# Patient Record
Sex: FEMALE | Race: WHITE
[De-identification: names, ages, dates, MRNs, and addresses within clinical notes are randomized per-mention and may not be internally consistent; named-entity substitution may affect disease eponyms.]

---

## 2017-04-17 ENCOUNTER — HOSPITAL ENCOUNTER (EMERGENCY)
Dept: HOSPITAL 59 - ER | Age: 57
LOS: 1 days | Discharge: HOME | End: 2017-04-18
Payer: MEDICARE

## 2017-04-17 DIAGNOSIS — L03.311: ICD-10-CM
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
## 2017-04-17 DIAGNOSIS — T85.738A: Primary | ICD-10-CM

## 2017-04-17 LAB
ANION GAP SERPL CALC-SCNC: 8.9 MMOL/L (ref 7–16)
BASOPHILS NFR BLD: 0.3 % (ref 0–6)
BUN SERPL-MCNC: 12 MG/DL (ref 7–17)
CO2 SERPL-SCNC: 29.1 MMOL/L (ref 22–30)
CREAT SERPL-MCNC: 0.7 MG/DL (ref 0.52–1.04)
EOSINOPHIL NFR BLD: 0.7 % (ref 0–6)
ERYTHROCYTE [DISTWIDTH] IN BLOOD BY AUTOMATED COUNT: 13.6 % (ref 11.5–14.5)
EST GLOMERULAR FILTRATION RATE: > 60 ML/MIN
GLUCOSE SERPL-MCNC: 95 MG/DL (ref 70–110)
GRANULOCYTES NFR BLD: 64.2 % (ref 47–80)
HCT VFR BLD CALC: 40.1 % (ref 35–47)
HGB BLD-MCNC: 13.1 GM/DL (ref 11.6–16)
LYMPHOCYTES NFR BLD AUTO: 26.6 % (ref 16–45)
MCH RBC QN AUTO: 29.7 PG (ref 27–33)
MCHC RBC AUTO-ENTMCNC: 32.7 G/DL (ref 32–36)
MCV RBC AUTO: 90.9 FL (ref 81–97)
MONOCYTES NFR BLD: 8.2 % (ref 0–9)
PLATELET # BLD: 227 K/UL (ref 130–400)
PMV BLD AUTO: 9.8 FL (ref 7.4–10.4)
RBC # BLD AUTO: 4.41 M/UL (ref 3.8–5.4)
WBC # BLD AUTO: 7 K/UL (ref 4.2–12.2)

## 2017-04-17 PROCEDURE — 80048 BASIC METABOLIC PNL TOTAL CA: CPT

## 2017-04-17 PROCEDURE — 99284 EMERGENCY DEPT VISIT MOD MDM: CPT

## 2017-04-17 PROCEDURE — 74176 CT ABD & PELVIS W/O CONTRAST: CPT

## 2017-04-17 PROCEDURE — 96365 THER/PROPH/DIAG IV INF INIT: CPT

## 2017-04-17 PROCEDURE — 85025 COMPLETE CBC W/AUTO DIFF WBC: CPT

## 2017-04-18 NOTE — EMERGENCY DEPARTMENT RECORD
History of Present Illness





- General


Chief complaint: General


Stated complaint: MORPHINE PUMP (MIGHT NEED STITCH)


Time Seen by Provider: 04/17/17 22:38


Source: Patient


Mode of Arrival: Ambulatory


Limitations: No limitations





- History of Present Illness


Initial comments: 


pt has a morphine pump in her abd wall that has become infected.  it is red and 

tender and today had copious yellow drainage.





MD complaint: Abscess/boil


Consistency: Getting worse


Improves with: None


Worsens with: Palpation


Context: Recent antibiotic


Associated symptoms: Denies other symptoms





- Related Data


 Home Medications











 Medication  Instructions  Recorded  Confirmed  Last Taken


 


Hydrocodone/Acetaminophen [Norco 1 tab PO Q4-6HR PRN  tab 11/02/15 04/17/17 04/

17/17





10mg/325mg]    


 


Morphine Sulfate/Pf [Morphine 0.5 8 mg IJ DAILY  vial 11/02/15 04/17/17 04/17/17





Mg/Ml Vial]    


 


Menthol/Herbal Drugs [Cough Drops] 1 each MM DAILY  lozenge 02/23/17 04/17/17 04 /17/17


 


Multivitamin/Iron/Folic Acid 1 each PO DAILY  tab 02/23/17 04/17/17 04/17/17





[Centrum Complete Multivit Tab]    








 Previous Rx's











 Medication  Instructions  Recorded


 


Cephalexin [Keflex] 500 mg PO QID #40 cap 04/18/17











 Allergies











Allergy/AdvReac Type Severity Reaction Status Date / Time


 


eszopiclone [From Lunesta] Allergy Intermediate RASH Verified 04/08/16 13:59


 


erythromycin lactobionate Allergy Unknown RASH Verified 04/08/16 13:59


 


Penicillins Allergy Unknown RASH Verified 04/08/16 13:59


 


Sulfa (Sulfonamide Allergy Unknown RASH Verified 04/08/16 13:59





Antibiotics)     


 


hydromorphone HCl Allergy  PT UNSURE Verified 04/12/16 14:14





[From Dilaudid]   OF REACTION  


 


nystatin [From Mycostatin] Allergy  PT UNSURE Verified 04/12/16 14:14





   OF REACTION  


 


nickel AdvReac Severe HIVES Verified 04/08/16 13:59


 


diclofenac sodium AdvReac  NAUSEA AND Verified 04/12/16 14:14





[From Voltaren]   VOMITING  


 


surgical tape AdvReac Severe BLISTERS Uncoded 09/29/15 12:23














Travel Screening





- Travel/Exposure Within Last 30 Days


Have you traveled within the last 30 days?: No





- Travel Symptoms


Symptom Screening: None





Review of Systems


Reviewed: No additional complaints except as noted below


Constitutional: Reports: As per HPI.  Denies: Chills, Fever, Malaise, Night 

sweats, Weakness, Weight change


Eyes: Reports: As per HPI.  Denies: Eye discharge, Eye pain, Photophobia, 

Vision change


ENT: Reports: As per HPI.  Denies: Congestion, Dental pain, Ear pain, Epistaxis

, Hearing loss, Throat pain


Respiratory: Reports: As per HPI.  Denies: Cough, Dyspnea, Hemoptysis, Stridor, 

Wheezes


Cardiovascular: Reports: As per HPI.  Denies: Arrhythmia, Chest pain, Dyspnea 

on exertion, Edema, Murmurs, Orthopnea, Palpitations, Paroxysmal nocturnal 

dyspnea, Rheumatic Fever, Syncope


Endocrine: Reports: As per HPI.  Denies: Fatigue, Heat or cold intolerance, 

Polydipsia, Polyuria


Gastrointestinal: Reports: As per HPI.  Denies: Abdominal pain, Constipation, 

Diarrhea, Hematemesis, Hematochezia, Melena, Nausea, Vomiting


Genitourinary: Reports: As per HPI.  Denies: Abnormal menses, Discharge, 

Dyspareunia, Dysuria, Frequency, Hematuria, Incontinence, Retention, Urgency


Musculoskeletal: Reports: As per HPI.  Denies: Arthralgia, Back pain, Gout, 

Joint swelling, Myalgia, Neck pain


Skin: Reports: As per HPI.  Denies: Bruising, Change in color, Change in hair/

nails, Lesions, Pruritus, Rash


Neurological: Reports: As per HPI.  Denies: Abnormal gait, Confusion, Headache, 

Numbness, Paresthesias, Seizure, Tingling, Tremors, Vertigo, Weakness


Psychiatric: Reports: As per HPI.  Denies: Anxiety, Auditory hallucinations, 

Depression, Homicidal thoughts, Suicidal thoughts, Visual hallucinations


Hematological/Lymphatic: Reports: As per HPI.  Denies: Anemia, Blood Clots, 

Easy bleeding, Easy bruising, Swollen glands





Past Medical History





- SOCIAL HISTORY


Smoking Status: Current every day smoker





- RESPIRATORY


Hx Respiratory Disorders: Yes


Hx Bronchitis: Yes (hx)


Hx Pneumonia: Yes (hx)





- CARDIOVASCULAR


Hx Cardio Disorders: No


Comment:: was good until problems with pump





- NEURO


Hx Neuro Disorders: Yes


Comment:: shingles 12-15 back left shoulder under left breast





- GI


Hx GI Disorders: Yes


Hx Abdominal Pain: Yes


Hx Nausea/Vomiting: Yes





- 


Hx Genitourinary Disorders: No





- ENDOCRINE


Hx Endocrine Disorders: No





- MUSCULOSKELETAL


Hx Musculoskeletal Disorders: Yes


Comment:: osteopenia





- PSYCH


Hx Psych Problems: No





- HEMATOLOGY/ONCOLOGY


Hx Hematology/Oncology Disorders: No





Family Medical History


Any Significant Family History?: No


Family Hx Comment (NOT TO BE USED IN PLACE OF ITEMS BELOW): denies





Physical Exam





- General


General Appearance: Alert, Oriented x3, Cooperative, Mild distress





- Head


Head exam: Normal inspection





- Eye


Eye exam: Normal appearance, PERRL, EOMI


Pupils: Normal accommodation





- ENT


ENT exam: Normal exam, Mucous membranes moist, Normal external ear exam, Normal 

orophraynx


Ear exam: Normal external inspection.  negative: External canal tenderness


Nasal Exam: Normal inspection.  negative: Discharge, Sinus tenderness


Mouth exam: Normal external inspection, Tongue normal


Teeth exam: Normal inspection.  negative: Dental caries


Throat exam: Normal inspection.  negative: Tonsillar erythema, Tonsillar exudate





- Neck


Neck exam: Normal inspection, Full ROM.  negative: Tenderness





- Respiratory


Respiratory exam: Normal lung sounds bilaterally.  negative: Respiratory 

distress





- Cardiovascular


Cardiovascular Exam: Regular rate, Normal rhythm, Normal heart sounds





- GI/Abdominal


GI/Abdominal exam: Soft, Normal bowel sounds, Tenderness, Other (erythema over 

morphine pump with purulent drainage)





- Rectal


Rectal exam: Deferred





- 


 exam: Deferred





- Extremities


Extremities exam: Normal inspection, Full ROM, Normal capillary refill.  

negative: Tenderness





- Back


Back exam: Reports: Normal inspection, Full ROM.  Denies: Muscle spasm, Rash 

noted, Tenderness





- Neurological


Neurological exam: Alert, CN II-XII intact, Normal gait, Oriented X3





- Psychiatric


Psychiatric exam: Normal affect, Normal mood





- Skin


Skin exam: Dry, Intact, Normal color, Warm





Course





 Vital Signs











  04/17/17 04/18/17





  21:48 00:44


 


Temperature 98.0 F 97.9 F


 


Pulse Rate 69 


 


Pulse Rate [  69





Pulse Ox Probe]  


 


Respiratory 16 16





Rate  


 


Blood Pressure 121/81 


 


Blood Pressure  120/77





[Right Arm]  


 


Pulse Ox 96 98














- Reevaluation(s)


Reevaluation #1: 





04/18/17 00:52


ct shows stranding





Reevaluation #2: 





04/18/17 00:59


dr pepe will be contacted at 0630





Medical Decision Making





- Lab Data


Result diagrams: 


 04/17/17 23:12





 04/17/17 23:12





 Lab Results











  04/17/17 04/17/17 Range/Units





  23:12 23:12 


 


WBC  7.0   (4.2-12.2)  K/uL


 


RBC  4.41   (3.80-5.40)  M/uL


 


Hgb  13.1   (11.6-16.0)  gm/dl


 


Hct  40.1   (35.0-47.0)  %


 


MCV  90.9   (81-97)  fl


 


MCH  29.7   (27-33)  pg


 


MCHC  32.7   (32-36)  g/dl


 


RDW  13.6   (11.5-14.5)  %


 


Plt Count  227   (130-400)  K/uL


 


MPV  9.8   (7.4-10.4)  fl


 


Gran %  64.2   (47-80)  %


 


Lymphocytes %  26.6   (16-45)  %


 


Monocytes %  8.2   (0-9)  %


 


Eosinophils %  0.7   (0-6)  %


 


Basophils %  0.3   (0-6)  %


 


Sodium   138  (136-145)  mmol/L


 


Potassium   3.9  (3.5-5.1)  mmol/L


 


Chloride   100  ()  mmol/L


 


Carbon Dioxide   29.1  (22-30)  mmol/L


 


Anion Gap   8.9  (7-16)  


 


BUN   12  (7-17)  mg/dL


 


Creatinine   0.7  (0.52-1.04)  mg/dL


 


Estimated GFR   > 60  ml/min


 


Random Glucose   95  ()  mg/dL


 


Calcium   9.3  (8.5-10.1)  mg/dL














Disposition


Disposition: Discharge


Clinical Impression: 


Cellulitis


Qualifiers:


 Site of cellulitis: trunk Site of cellulitis of trunk: abdominal wall 

Qualified Code(s): L03.311 - Cellulitis of abdominal wall


Disposition: Home, Self-Care


Condition: (1) Good


Instructions:  Cellulitis (ED)


Additional Instructions: 


follow up with dr pepe without fail in the morning.  return sooner if worse


Prescriptions: 


Cephalexin [Keflex] 500 mg PO QID #40 cap


Forms:  Patient Portal Access

## 2017-04-19 ENCOUNTER — HOSPITAL ENCOUNTER (OUTPATIENT)
Dept: HOSPITAL 59 - SUR | Age: 57
Discharge: HOME | End: 2017-04-19
Attending: PAIN MEDICINE
Payer: MEDICARE

## 2017-04-19 DIAGNOSIS — M96.1: ICD-10-CM

## 2017-04-19 DIAGNOSIS — M54.16: ICD-10-CM

## 2017-04-19 DIAGNOSIS — T85.738A: Primary | ICD-10-CM

## 2017-04-19 DIAGNOSIS — M54.17: ICD-10-CM

## 2017-04-19 LAB
ACTH PLAS-MCNC: 3.5 MINUTES (ref 1.5–7)
APPEARANCE CSF: CLEAR
APTT BLD: 26.6 SECONDS (ref 24.5–39.1)
COLOR CSF: COLORLESS
CSF RBC: 0 /MM3
CYCLOSPORINE BLD-MCNC: 0 /UL
GLUCOSE CSF-MCNC: 67 MG/DL (ref 40–75)
INR PPP: 0.87
PROTHROMBIN TIME: 9.8 SECONDS (ref 9.5–12.1)
TOTAL PROTEIN,CSF: 36 MG/DL (ref 12–60)

## 2017-04-19 PROCEDURE — 84157 ASSAY OF PROTEIN OTHER: CPT

## 2017-04-19 PROCEDURE — 85730 THROMBOPLASTIN TIME PARTIAL: CPT

## 2017-04-19 PROCEDURE — 89051 BODY FLUID CELL COUNT: CPT

## 2017-04-19 PROCEDURE — 82945 GLUCOSE OTHER FLUID: CPT

## 2017-04-19 PROCEDURE — 85002 BLEEDING TIME TEST: CPT

## 2017-04-19 PROCEDURE — 85610 PROTHROMBIN TIME: CPT

## 2017-04-19 NOTE — HISTORY AND PHYSICAL REPORT
CHIEF COMPLAINT/HISTORY OF CHIEF COMPLAINT:  This patient presented to the 
Emergency Room two nights ago with a draining wound in the left lower abdominal 
quadrant pump pouch where a spinal opioid infusion system with Morphine is in 
place.  The report from the Emergency Room physician which was received and 
discussed indicated a possible abscess.  The previous morning she was evaluated 
in the office, there was some drainage, and the site did appear to demonstrate 
some cellulitis.  Her infusion at that point was reduced by 50% from 3.0 to 1.5 
mg per day of Morphine.  



PAST MEDICAL HISTORY:  Partial deafness.  



PAST SURGICAL HISTORY:  Lumbar spinal surgery and pump implant.  



MEDICATIONS ON ADMISSION:  List to be provided.  



ALLERGIES:  



FAMILY/PSYCHOSOCIAL HISTORY:  



REVIEW OF SYSTEMS:  



PHYSICAL EXAMINATION:  Height and weight are not known.  Vital signs are not 
available.  

HEENT:  Within normal limits.  

LUNGS:  Clear.  

HEART:  Regular rate and rhythm.  

ABDOMEN:  Nontender.  

MUSCULOSKELETAL:  Examination of the musculoskeletal system shows a pump in the 
left lower abdominal quadrant.  There is inflammation at the mid incisional 
line.  No drainage at this point.  There is cellulitis around the skin.  The 
posterior incisional site for the catheter is intact.  Sensory fields are 
intact.  

NEUROLOGIC:  Cranial nerves are intact.  



IMPRESSION:  

1.  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 

2.  LUMBAR RADICULITIS, ICD10 CODE M54.16 AND M54.17.  

3.  PUMP POUCH INFECTION.  



PLAN:  The patient is here for removal of the device, both pump and catheter.  
We have discussed the possibility of flipping the patient and replacing the 
system posteriorly, however, this decision will only be made after the site is 
inspected.  If it is felt that the infection perhaps progressed into the pouch 
then the entire system will be removed and she will be started on the 
appropriate medication protocol.  







______________________________                  ___________________Lorne WAGONER D.O.                                            Date & Time



JOB NUMBER:  [id number]
MTDD

## 2017-04-19 NOTE — OPERATIVE NOTE - FERRO
DATE OF SURGERY:     04/19/17



PREOPERATIVE DIAGNOSES:   

1. POST LUMBAR LAMINECTOMY SYNDROME, ICD-10 CODE = M96.1. 

2. LUMBAR RADICULITIS, ICD-10 CODE = M54.16 AND M54.17. 

3. INTRASPINAL INFUSION SYSTEM MORPHINE PUMP POUCH INFECTION.   



OPERATION:   

1. INCISION, SUBCUTANEOUS DISSECTION, AND REMOVAL OF INDWELLING PUMP AT LEFT 
ABDOMINAL QUADRANT WITH CULTURES. 

2. INCISION, SUBCUTANEOUS DISSECTION, AND REMOVAL OF INDWELLING SPINAL CATHETER 
L3-4 WITH CULTURES. 

3. CSF ANALYSIS AND SPECIMENS TAKEN FOR MICROBIOLOGY AND ANALYSIS.  



SURGEON:        JOHN WAGONER D.O.



ANESTHESIA:     LOCAL SEDATION.



ANESTHESIA PROVIDER:     LINDA ALDANA CRNA. 



INDICATION:  This patient presents with a history of intractable lumbar 
radiculitis managed to a spinal opioid infusion system with Morphine. Slowly 
evolving appeared to be a puncture at the midpoint of the incisional site, 
which over the last 48 hours drained purulent discharge. She was seen in the 
Emergency Room, cultures were taken. She presented to the office. The area in 
the left lower abdominal quadrant appeared to be somewhat cellulitic and 
discolored. She was afebrile and not on antibiotics. She is here for removal of 
the system and appropriate cultures.  



PROCEDURE:  Intravenous line, vital sign monitoring, IV sedation, prepped and 
draped sterile technique. This patient was positioned supine. The left lower 
abdominal pump quadrant pouch noted. Sterile prep and technique and under 
imaging with local anesthetic, the incision was opened, cultures immediately 
taken aerobic and anaerobic. The pump was then exteriorized. The pouch was 
inspected and there appeared to be membranous coating along the inner surface, 
which was felt to be related to the infection. At that point, antibiotic 
irrigation and Bovie for hemostasis. The catheter segments and components were 
then removed along with the pump. All catheter components submitted for 
culture. Antibiotic irrigation. Bovie for hemostasis. The incision was then 
closed Vicryl for fascia and staples for skin. She was turned prone. The spinal 
catheter incision after sterile prep and technique was infiltrated, incision 
made, and subcutaneous dissection was conducted to the implanted catheter. 
Cultures taken aerobic and anaerobic. A pursestring suture was placed. The end 
of the catheter was cut and then using a 25-gauge needle and a 3 mL syringe, 
six tubes for CSF analysis 1 mL CSF per tube was then take for appropriate 
cultures and analysis. The pursestring was tightened, it was to stop CSF leak, 
and then after the catheter was removed intact. Antibiotic irrigation. Bovie 
for hemostasis. A previous catheter component from a past pump was seen 
subcutaneous incision made and attempts to pull the catheter were partially 
successful. The old residual catheter was not related to the current system so 
it was not felt to be infectious. At that point, all incisions closed Vicryl 
for fascia and staples for skin. Dressings were placed. She was transported to 
the Recovery Room stable, pillow under head and knee. She will be managed, 
encouraged to stay overnight for observation.   



DISCHARGE INSTRUCTIONS:

1. The sites will remain clean and dry. No showering or bathing in any way that 
would disrupt dressings.  

2. Standard medications including the antibiotic, Levaquin, 500 mg once a day 
for 14 days. 

3. Medications will be provided to manage withdrawal including Librium, oral 
Morphine, which was the analgesic of choice, and Zofran for symptomatic 
nauseousness. She will be seen in the office in 3-5 days to check the sites. 
She will manage withdrawal symptoms and pain with oral medications. 

4. All other instructions provided, numbers to contact, problems given. She 
will be seen in the office.  







______________________      ________________

JOHN WAGONER D.O.                Date & Time

cc: Dr. Armand Govea

JOB NUMBER: [id number]
MTDD

## 2017-04-20 ENCOUNTER — HOSPITAL ENCOUNTER (EMERGENCY)
Dept: HOSPITAL 59 - ER | Age: 57
Discharge: HOME | End: 2017-04-20
Payer: MEDICARE

## 2017-04-20 DIAGNOSIS — F41.9: ICD-10-CM

## 2017-04-20 DIAGNOSIS — G89.29: ICD-10-CM

## 2017-04-20 DIAGNOSIS — M54.9: ICD-10-CM

## 2017-04-20 DIAGNOSIS — T40.2X5A: ICD-10-CM

## 2017-04-20 DIAGNOSIS — F11.23: Primary | ICD-10-CM

## 2017-04-20 DIAGNOSIS — R45.1: ICD-10-CM

## 2017-04-20 LAB
ALBUMIN SERPL-MCNC: 3.8 GM/DL (ref 3.5–5)
ALBUMIN/GLOB SERPL: 1.3 {RATIO} (ref 1.1–1.8)
ALP SERPL-CCNC: 82 U/L (ref 38–126)
ALT SERPL-CCNC: 24 U/L (ref 9–52)
ANION GAP SERPL CALC-SCNC: 7.4 MMOL/L (ref 7–16)
AST SERPL-CCNC: 17 U/L (ref 14–36)
BILIRUB SERPL-MCNC: 0.27 MG/DL (ref 0.2–1.3)
BUN SERPL-MCNC: 4 MG/DL (ref 7–17)
CO2 SERPL-SCNC: 27.6 MMOL/L (ref 22–30)
CREAT SERPL-MCNC: 0.7 MG/DL (ref 0.52–1.04)
EOSINOPHIL NFR BLD: 2 % (ref 0–6)
ERYTHROCYTE [DISTWIDTH] IN BLOOD BY AUTOMATED COUNT: 13.9 % (ref 11.5–14.5)
EST GLOMERULAR FILTRATION RATE: > 60 ML/MIN
GLOBULIN SER-MCNC: 3 GM/DL (ref 1.4–4.8)
GLUCOSE SERPL-MCNC: 110 MG/DL (ref 70–110)
HCT VFR BLD CALC: 39.3 % (ref 35–47)
HGB BLD-MCNC: 12.7 GM/DL (ref 11.6–16)
LYMPHOCYTES NFR BLD: 14 % (ref 16–45)
MCH RBC QN AUTO: 29.5 PG (ref 27–33)
MCHC RBC AUTO-ENTMCNC: 32.3 G/DL (ref 32–36)
MCV RBC AUTO: 91.4 FL (ref 81–97)
MONOCYTES NFR BLD: 10 % (ref 0–9)
NEUTROPHILS NFR BLD AUTO: 74 % (ref 47–80)
PLATELET # BLD EST: NORMAL 10*3/UL
PLATELET # BLD: 244 K/UL (ref 130–400)
PMV BLD AUTO: 9.3 FL (ref 7.4–10.4)
PROT SERPL-MCNC: 6.8 GM/DL (ref 6.3–8.2)
RBC # BLD AUTO: 4.3 M/UL (ref 3.8–5.4)
RBC MORPH BLD: NORMAL
WBC # BLD AUTO: 5.1 K/UL (ref 4.2–12.2)

## 2017-04-20 PROCEDURE — 99284 EMERGENCY DEPT VISIT MOD MDM: CPT

## 2017-04-20 PROCEDURE — 80053 COMPREHEN METABOLIC PANEL: CPT

## 2017-04-20 PROCEDURE — 96372 THER/PROPH/DIAG INJ SC/IM: CPT

## 2017-04-20 PROCEDURE — 99283 EMERGENCY DEPT VISIT LOW MDM: CPT

## 2017-04-20 PROCEDURE — 85027 COMPLETE CBC AUTOMATED: CPT

## 2017-04-20 NOTE — EMERGENCY DEPARTMENT RECORD
Anxiety





- General


Chief Complaint: General


Stated Complaint: WITHDRAWAL


Time Seen by Provider: 17 09:34


Source: Patient


Mode of Arrival: Wheelchair


Limitations: No limitations





- History of Present Illness


Initial Comments: 


The patient is here due to a one day hx of anxiety, restlessness and shaking. 

She had her morphine pump removed yesterday due to an infection and now feels 

like she is in withdrawal. She denies any CP, SOB or AP but did have nausea 

earlier today but that has resolved. The patient has had a morphine pump for 

the last 12 years due to chronic back pain and has been in withdrawal once 

before when it malfunctioned. She denies any fever, chills, or HA.





MD Complaint: Anxiety


Onset/Timin


-: Days(s)





- Related Data


Home Medications: 


 Home Medications











 Medication  Instructions  Recorded  Confirmed  Last Taken


 


Hydrocodone/Acetaminophen [Norco 1 tab PO Q4-6HR PRN  tab 11/02/15 04/20/17 04/

17/17





10mg/325mg]    


 


Morphine Sulfate/Pf [Morphine 0.5 8 mg IJ DAILY  vial 11/02/15 04/20/17 04/17/17





Mg/Ml Vial]    


 


Multivitamin/Iron/Folic Acid 1 each PO DAILY  tab 17





[Centrum Complete Multivit Tab]    


 


Chlordiazepoxide HCl [Librium] 25 mg PO Q6H 17 Unknown


 


Levofloxacin [Levaquin] 500 mg PO BID 17 Unknown


 


Morphine Sulfate 15 mg PO Q6H PRN 17 23:14











Allergies/Adverse Reactions: 


 Allergies











Allergy/AdvReac Type Severity Reaction Status Date / Time


 


eszopiclone [From Lunesta] Allergy Intermediate RASH Verified 16 13:59


 


erythromycin lactobionate Allergy Unknown RASH Verified 16 13:59


 


Penicillins Allergy Unknown RASH Verified 16 13:59


 


Sulfa (Sulfonamide Allergy Unknown RASH Verified 16 13:59





Antibiotics)     


 


hydromorphone HCl Allergy  PT UNSURE Verified 16 14:14





[From Dilaudid]   OF REACTION  


 


nystatin [From Mycostatin] Allergy  PT UNSURE Verified 16 14:14





   OF REACTION  


 


nickel AdvReac Severe HIVES Verified 16 13:59


 


diclofenac sodium AdvReac  NAUSEA AND Verified 16 14:14





[From Voltaren]   VOMITING  


 


surgical tape AdvReac Severe BLISTERS Uncoded 09/29/15 12:23














Travel Screening





- Travel/Exposure Within Last 30 Days


Have you traveled within the last 30 days?: No





Review of Systems


Constitutional: Denies: Chills, Fever


Eyes: Denies: Eye discharge


ENT: Denies: Congestion


Respiratory: Denies: Cough, Dyspnea





Past Medical History





- SOCIAL HISTORY


Smoking Status: Light tobacco smoker (<10/day)


Alcohol Use: None


Drug Use: None





- RESPIRATORY


Hx Respiratory Disorders: Yes


Hx Bronchitis: Yes (on inhaler since bronchitis 2-)


Hx Pneumonia: Yes (hx)





- CARDIOVASCULAR


Hx Cardio Disorders: No


Comment:: was good until problems with pump





- NEURO


Hx Neuro Disorders: Yes


Comment:: shingles 12-15 back left shoulder under left breast





- GI


Hx GI Disorders: Yes


Hx Abdominal Pain: Yes


Hx Nausea/Vomiting: Yes





- 


Hx Genitourinary Disorders: No





- ENDOCRINE


Hx Endocrine Disorders: No





- MUSCULOSKELETAL


Hx Musculoskeletal Disorders: Yes


Hx Arthritis: Yes


Comment:: osteopenia





- PSYCH


Hx Psych Problems: No





- HEMATOLOGY/ONCOLOGY


Hx Hematology/Oncology Disorders: No





Family Medical History


Any Significant Family History?: No


Family Hx Comment (NOT TO BE USED IN PLACE OF ITEMS BELOW): denies





Physical Exam





- General


General Appearance: Alert, Oriented x3, Cooperative, Mild distress (due to 

anxiety and restlessness.)





- Head


Head exam: Normal inspection





- Eye


Eye exam: Normal appearance, PERRL





- Neck


Neck exam: Normal inspection, Full ROM.  negative: Tenderness





- Respiratory


Respiratory exam: Normal lung sounds bilaterally.  negative: Respiratory 

distress





- Cardiovascular


Cardiovascular Exam: Regular rate, Normal rhythm, Normal heart sounds





- GI/Abdominal


GI/Abdominal exam: Soft, Normal bowel sounds.  negative: Tenderness





- Extremities


Extremities exam: Normal inspection, Full ROM, Normal capillary refill.  

negative: Tenderness





- Neurological


Neurological exam: Alert, Normal gait, Oriented X3.  negative: Abnormal gait, 

Motor sensory deficit





- Psychiatric


Psychiatric exam: Anxious.  negative: Depressed, Flat affect





Course





 Vital Signs











  17





  09:26


 


Temperature 98.3 F


 


Pulse Rate 88


 


Respiratory 18





Rate 


 


Blood Pressure 126/76


 


Pulse Ox 99














- Reevaluation(s)


Reevaluation #1: 


The patient is doing much better at this time. She is now resting comfortably 

with no restlessness or anxiety.





17 10:19





Reevaluation #2: 


The patient is still doing very well and is resting comfortably. 





17 11:07





Reevaluation #3: 


The patient is doing very well presently. She denies any nausea, vomiting, 

diarrhea or any anxiety presently. I have been unable to contact Dr. Mariscal but 

did discuss the case with his nurse. The patient does feel comfortable going 

home and will F/U with Dr. Mariscal next week as planned.





17 11:34








Medical Decision Making





- Lab Data


Result diagrams: 


 17 09:53





 17 09:53





Disposition


Disposition: Discharge


Clinical Impression: 


 Opiate withdrawal


Disposition: Home, Self-Care


Condition: (1) Good


Instructions:  Opioid Withdrawal (ED)


Additional Instructions: 


Please continue your regular medicines as directed per Dr. Mariscal. Please return 

to the ER for any worsening symptoms. Please see Dr. Mariscal for recheck next 

week as planned.


Forms:  Patient Portal Access


Time of Disposition: 11:36

## 2017-11-19 ENCOUNTER — HOSPITAL ENCOUNTER (EMERGENCY)
Dept: HOSPITAL 59 - ER | Age: 57
Discharge: HOME | End: 2017-11-19
Payer: MEDICARE

## 2017-11-19 DIAGNOSIS — J01.00: Primary | ICD-10-CM

## 2017-11-19 DIAGNOSIS — F17.210: ICD-10-CM

## 2017-11-19 PROCEDURE — 99282 EMERGENCY DEPT VISIT SF MDM: CPT

## 2017-11-19 NOTE — EMERGENCY DEPARTMENT RECORD
History of Present Illness





- General


Chief complaint: Cold


Stated complaint: COLD


Time Seen by Provider: 17 12:21


Source: Patient, Family


Mode of Arrival: Ambulatory


Limitations: No limitations





- History of Present Illness


Initial comments: 


56 yo female presents with upper respiratory symptoms.  She has been having 

symptoms for about 9-10 days.  She developed nasal congestion with drainage and 

facial pressure.  She has some pressure in her ears.  No significant sore 

throat.  Mild non productive cough.  No fevers.  No vomiting or diarrhea.  No 

rash.  She has irritation in the right nostril.  No epistaxis.  The nasal 

discharge is discolored.





MD complaint: Ear pain, Other (Congestion)


Onset/Timin


-: Days(s) (10)


Location: Nose


Severity: Moderate


Quality: Aching


Improves with: None


Worsens with: None


Associated Symptoms: Cough, Other





- Related Data


 Home Medications











 Medication  Instructions  Recorded  Confirmed  Last Taken


 


Pregabalin [Lyrica] 50 mg PO DAILY 17 Unknown








 Previous Rx's











 Medication  Instructions  Recorded


 


Doxycycline Hyclate [Doxycycline] 100 mg PO BID #14 cap 17


 


Triamcinolone Acetonide 16.5 gm NS DAILY #1 inh 17





[Nasacort-Aq]  











 Allergies











Allergy/AdvReac Type Severity Reaction Status Date / Time


 


eszopiclone [From Lunesta] Allergy Intermediate RASH Verified 17 12:22


 


erythromycin lactobionate Allergy Unknown RASH Verified 17 12:22


 


Penicillins Allergy Unknown RASH Verified 17 12:22


 


Sulfa (Sulfonamide Allergy Unknown RASH Verified 17 12:22





Antibiotics)     


 


hydromorphone HCl Allergy  PT UNSURE Verified 17 12:22





[From Dilaudid]   OF REACTION  


 


nystatin [From Mycostatin] Allergy  PT UNSURE Verified 17 12:22





   OF REACTION  


 


nickel AdvReac Severe HIVES Verified 17 12:22


 


diclofenac sodium AdvReac  NAUSEA AND Verified 17 12:22





[From Voltaren]   VOMITING  


 


surgical tape AdvReac Severe BLISTERS Uncoded 09/29/15 12:23














Travel Screening





- Travel/Exposure Within Last 30 Days


Have you traveled within the last 30 days?: No





Review of Systems


Constitutional: Reports: Malaise.  Denies: Chills, Fever


Eyes: Denies: Eye discharge, Eye pain, Photophobia, Vision change


ENT: Reports: Congestion, Ear pain.  Denies: Epistaxis, Throat pain


Respiratory: Reports: Cough.  Denies: Dyspnea, Hemoptysis, Stridor, Wheezes


Cardiovascular: Denies: Chest pain, Palpitations, Syncope


Endocrine: Denies: Fatigue, Polydipsia, Polyuria


Gastrointestinal: Denies: Abdominal pain, Diarrhea, Nausea, Vomiting


Genitourinary: Denies: Dysuria, Urgency


Musculoskeletal: Denies: Arthralgia, Back pain, Myalgia, Neck pain


Skin: Denies: Bruising, Change in color, Rash


Neurological: Denies: Headache, Numbness, Tremors, Vertigo, Weakness


Psychiatric: Denies: Anxiety


Hematological/Lymphatic: Denies: Blood Clots, Easy bleeding, Easy bruising, 

Swollen glands





Past Medical History





- SOCIAL HISTORY


Smoking Status: Light tobacco smoker (<10/day)


Drug Use: None





- RESPIRATORY


Hx Respiratory Disorders: Yes


Hx Bronchitis: Yes (on inhaler since bronchitis 2-17)


Hx Pneumonia: Yes (hx)





- CARDIOVASCULAR


Hx Cardio Disorders: No


Comment:: was good until problems with pump





- NEURO


Hx Neuro Disorders: Yes


Comment:: shingles 12-15 back left shoulder under left breast





- GI


Hx GI Disorders: Yes


Hx Abdominal Pain: Yes


Hx Nausea/Vomiting: Yes





- 


Hx Genitourinary Disorders: No





- ENDOCRINE


Hx Endocrine Disorders: No





- MUSCULOSKELETAL


Hx Musculoskeletal Disorders: Yes


Hx Arthritis: Yes


Comment:: osteopenia





- PSYCH


Hx Psych Problems: No





- HEMATOLOGY/ONCOLOGY


Hx Hematology/Oncology Disorders: No





Family Medical History


Family Hx Comment (NOT TO BE USED IN PLACE OF ITEMS BELOW): denies





Physical Exam





- General


General Appearance: Alert, Oriented x3, Cooperative, No acute distress


Limitations: No limitations





- Head


Head exam: Atraumatic, Normocephalic, Normal inspection





- Eye


Eye exam: Normal appearance, PERRL, Periorbital tenderness, Other (maxillary 

and fronatal tenderness to palpation).  negative: Conjunctival injection, 

Periorbital swelling





- ENT


ENT exam: Mucous membranes moist.  negative: Mucous membranes dry


Ear exam: Normal external inspection.  negative: External canal tenderness


Nasal Exam: Discharge, Sinus tenderness.  negative: Active bleeding


Mouth exam: Tongue normal.  negative: Drooling, Muffled voice, Trismus


Teeth exam: Normal inspection


Throat exam: Normal inspection.  negative: Tonsillar erythema, Tonsillomegaly, 

Tonsillar exudate, R peritonsillar mass, L peritonsillar mass





- Neck


Neck exam: Normal inspection, Full ROM.  negative: Lymphadenopathy, Tenderness





- Respiratory


Respiratory exam: Normal lung sounds bilaterally.  negative: Accessory muscle 

use, Decreased breath sounds, Prolonged expiratory, Respiratory distress, 

Rhonchi, Stridor, Wheezes





- Cardiovascular


Cardiovascular Exam: Regular rate, Normal rhythm, Normal heart sounds


Peripheral Pulses: 2+: Radial (R), Radial (L)





- GI/Abdominal


GI/Abdominal exam: Soft.  negative: Tenderness





- Rectal


Rectal exam: Deferred





- 


 exam: Deferred





- Extremities


Extremities exam: Normal inspection, Full ROM, Normal capillary refill.  

negative: Pedal edema, Tenderness





- Back


Back exam: Reports: Normal inspection, Full ROM.  Denies: CVA tenderness (R), 

CVA tenderness (L), Muscle spasm, Rash noted, Tenderness





- Neurological


Neurological exam: Alert, Normal gait, Oriented X3





- Psychiatric


Psychiatric exam: Normal affect, Normal mood





- Skin


Skin exam: Dry, Intact, Normal color, Warm





Course





 Vital Signs











  17





  12:18


 


Temperature 97.8 F


 


Pulse Rate 89


 


Respiratory 16





Rate 


 


Blood Pressure 136/90


 


Pulse Ox 95














- Reevaluation(s)


Reevaluation #1: 


The vitals were reviewed.


17 12:24








Disposition


Disposition: Discharge


Clinical Impression: 


Sinusitis


Qualifiers:


 Sinusitis location: maxillary Chronicity: acute Recurrence: not specified as 

recurrent Qualified Code(s): J01.00 - Acute maxillary sinusitis, unspecified





Disposition: Home, Self-Care


Condition: (1) Good


Instructions:  Sinusitis (ED)


Additional Instructions: 


Call your doctor to be seen this week if not improving


Return to the ER if worse or any new concerns


Rest and stay well hydrated


Prescriptions: 


Doxycycline Hyclate [Doxycycline] 100 mg PO BID #14 cap


Triamcinolone Acetonide [Nasacort-Aq] 16.5 gm NS DAILY #1 inh


Forms:  Patient Portal Access


Time of Disposition: 12:37





Quality





- Quality Measures


Quality Measures: N/A





- Blood Pressure Screening


Does Patient Have Any of the Following: No


Blood Pressure Classification: Hypertensive Reading


Systolic Measurement: 136


Diastolic Measurement: 90


Screening for High Blood Pressure: < Pre-Hypertensive BP, F/U Documented > [

]


Pre-Hypertensive Follow-up Interventions: Referral to alternative/primary care 

provider.

## 2019-09-27 ENCOUNTER — HOSPITAL ENCOUNTER (OUTPATIENT)
Dept: HOSPITAL 59 - HOP | Age: 59
Discharge: HOME | End: 2019-09-27
Attending: INTERNAL MEDICINE
Payer: MEDICARE

## 2019-09-27 DIAGNOSIS — Z12.11: Primary | ICD-10-CM

## 2019-09-27 DIAGNOSIS — Z53.09: ICD-10-CM

## 2019-09-27 PROCEDURE — 00812 ANES LWR INTST SCR COLSC: CPT

## 2019-09-30 NOTE — OPERATIVE NOTE
SURGEON: Caprice Diaz MD  



OPERATION: COLONOSCOPY. 



INDICATIONS: This is a 59-year-old female with average risk for colorectal 
cancer who presented for her first screening colonoscopy. 



POSTOPERATIVE DIAGNOSIS: Poor bowel preparation, could not complete the 
examination beyond sigmoid colon. 



ANESTHESIA: Sedation is per Anesthesia. Pulse oximetry was monitored throughout 
the procedure to maintain O2 saturation of 90% or greater. Supplemental oxygen 
was administered via nasal cannula. Cardiac and vital signs were monitored 
throughout the duration of the procedure, and they were stable. 



The procedure of colonoscopy and risks and alternatives of the procedure, 
including the risk of bleeding and perforation, among others, were explained to 
the patient who voiced understanding and agreed to have the procedure done. 
Physical examination was performed, and the patient was found stable for 
sedation. 



PROCEDURE: The patient was placed in the left lateral position. Sedation was 
initiated. A digital rectal exam was performed and showed some mild external 
hemorrhoids with no palpable rectal masses. An Olympus PCF-180AL colonoscope was
then inserted into the rectum under direct visualization. It was advanced into 
the sigmoid colon without difficulty. The colonic mucosa was carefully examined 
upon introduction of the colonoscope. There was a large amount of stool debris 
with inability to advance the colonoscope beyond the sigmoid colon. The 
colonoscope was then withdrawn and the procedure was terminated. The patient 
tolerated the procedure well without any immediate complications. The patient 
remained with stable vital signs and was transferred to the recovery room.



RECOMMENDATIONS: She should have a repeat bowel preparation for 2 days and have 
a repeat colonoscopy as soon as practical. 



Thank you for allowing me to participate in the care of your patient. 

MARZENA